# Patient Record
Sex: MALE | Race: WHITE | NOT HISPANIC OR LATINO | URBAN - METROPOLITAN AREA
[De-identification: names, ages, dates, MRNs, and addresses within clinical notes are randomized per-mention and may not be internally consistent; named-entity substitution may affect disease eponyms.]

---

## 2018-03-12 ENCOUNTER — THERAPY VISIT (OUTPATIENT)
Dept: PHYSICAL THERAPY | Facility: CLINIC | Age: 38
End: 2018-03-12
Payer: OTHER MISCELLANEOUS

## 2018-03-12 DIAGNOSIS — M25.611 DECREASED ROM OF RIGHT SHOULDER: ICD-10-CM

## 2018-03-12 DIAGNOSIS — M25.621 DECREASED ROM OF RIGHT ELBOW: ICD-10-CM

## 2018-03-12 DIAGNOSIS — M25.511 RIGHT SHOULDER PAIN: Primary | ICD-10-CM

## 2018-03-12 DIAGNOSIS — Z47.89 AFTERCARE FOLLOWING SURGERY OF THE MUSCULOSKELETAL SYSTEM: ICD-10-CM

## 2018-03-12 PROCEDURE — 97110 THERAPEUTIC EXERCISES: CPT | Mod: GP | Performed by: PHYSICAL THERAPIST

## 2018-03-12 PROCEDURE — 97161 PT EVAL LOW COMPLEX 20 MIN: CPT | Mod: GP | Performed by: PHYSICAL THERAPIST

## 2018-03-12 NOTE — PROGRESS NOTES
"[facility] Initial Evaluation  Subjective:  Patient is a 37 year old male presenting with rehab right shoulder hpi. The history is provided by the patient. No  was used.   Virgil Collazo is a 37 year old male with a right shoulder condition.  Condition occurred with:  A fall.  Condition occurred: at work.  This is a recurrent condition  Patient reports that original injury occurred on 08/30.16 following a fall at work. The injury was initially treated conservatively, but ultimately required a biceps tenodesis on 12/29/17. On 01/01/18 he was sleeping in recliner and awoke to an \"awful pain\" in his R shoulder and reports MRI confirmed he \"ripped the tendon off the anchor\". Surgery completed again on 2/2/18. Patient has follow-up with surgeon on 3/20/18..    Patient reports pain:  Anterior and lateral.  Radiates to:  Shoulder and elbow.  Pain is described as other (Throbbing) and is constant and reported as 5/10.   Pain is the same all the time.  Symptoms are exacerbated by certain positions and relieved by rest.    Special tests:  X-ray and MRI.  Previous treatment includes surgery.    General health as reported by patient is good.  Pertinent medical history includes:  High blood pressure.  Medical allergies: no.  Other surgeries include:  Orthopedic surgery (R elbow).  Current medications:  High blood pressure medication.  Current occupation is Construction/service check.  Patient is currently not working due to present treatment problem (Return to work on light duty 4/2/18).  Primary job tasks include:  Prolonged standing, lifting, repetitive tasks, operating a machine and driving.    Barriers include:  None as reported by the patient.    Red flags:  None as reported by the patient.                        Objective:  Standing Alignment:    Cervical/Thoracic:  Forward head  Shoulder/UE:  Rounded shoulders                                  Cervical/Thoracic " Evaluation  Cervical AROM: normal (Increased muscular tension through all AROM)                                  Shoulder Evaluation:  ROM:  AROM:  : Unable to assess all motions due to precautions.                            PROM:    Flexion:  Right: Scaption to ~70 degrees                                Strength:  not assessed                                 ROM:  AROM:      Flexion Elbow:  Right:WFL  Extension Elbow:  Right: Lacking 40 degrees  Flexion Wrist:  Right: WFL  Extension Wrist:  Right:  WFL  Supination Elbow/Wrist:  Right: Decreased w/ discomfort  Pronation Elbow/Wrist:  Right: Decreased w/ discomfort  Radial Deviation:  Right: WFL  Ulnar Deviation:  Right: WFL        Pain: Significant guarding secondary to pain  Endfeel: Empty  Strength:  not assessed                                                        General     Review of Systems   Skin:        Incisions healed with good scar mobility       Assessment/Plan:    Patient is a 37 year old male with right side shoulder and right side elbow complaints.    Patient has the following significant findings with corresponding treatment plan.                Diagnosis 1:  S/p R biceps tenodesis  Pain -  hot/cold therapy, electric stimulation, manual therapy, splint/taping/bracing/orthotics, self management, education and home program  Decreased ROM/flexibility - manual therapy, therapeutic exercise and home program  Decreased joint mobility - manual therapy, therapeutic exercise and home program  Decreased strength - therapeutic exercise, therapeutic activities and home program  Decreased proprioception - neuro re-education, therapeutic activities and home program  Impaired muscle performance - neuro re-education and home program  Decreased function - therapeutic activities and home program  Impaired posture - neuro re-education and home program    Therapy Evaluation Codes:   1) History comprised of:   Personal factors that impact the plan of care:       Anxiety, Profession and Work status.    Comorbidity factors that impact the plan of care are:      High blood pressure.     Medications impacting care: High blood pressure.  2) Examination of Body Systems comprised of:   Body structures and functions that impact the plan of care:      Elbow and Shoulder.   Activity limitations that impact the plan of care are:      Bathing, Cooking, Driving, Dressing, Grasping, Lifting and Working.  3) Clinical presentation characteristics are:   Stable/Uncomplicated.  4) Decision-Making    Low complexity using standardized patient assessment instrument and/or measureable assessment of functional outcome.  Cumulative Therapy Evaluation is: Low complexity.    Previous and current functional limitations:  (See Goal Flow Sheet for this information)    Short term and Long term goals: (See Goal Flow Sheet for this information)     Communication ability:  Patient appears to be able to clearly communicate and understand verbal and written communication and follow directions correctly.  Treatment Explanation - The following has been discussed with the patient:   RX ordered/plan of care  Anticipated outcomes  Possible risks and side effects  This patient would benefit from PT intervention to resume normal activities.   Rehab potential is good.    Frequency:  2 X week for 4 weeks, then 1X week for additional 4 weeks with additional follow-up two weeks after for a total of 10 weeks , once daily  Duration:  for 10 total weeks  Discharge Plan:  Achieve all LTG.  Independent in home treatment program.  Return to work with or without restrictions.  Reach maximal therapeutic benefit.    Please refer to the daily flowsheet for treatment today, total treatment time and time spent performing 1:1 timed codes.

## 2018-03-12 NOTE — LETTER
"[facility] PHYSICAL [facility]ERAPY  [address] [address]  [phone]    2018    Re: Virgil Collazo   :   1980  MRN:  [id number]   REFERRING PHYSICIAN:   [facility]    Date of Initial Evaluation:  3/12/2018  Visits:  Rxs Used: 1  Reason for Referral:     Right shoulder pain  Decreased ROM of right shoulder  Decreased ROM of right elbow  Aftercare following surgery of the musculoskeletal system    EVALUATION SUMMARY    [place] [facility] Initial Evaluation  Subjective:  Patient is a 37 year old male presenting with rehab right shoulder hpi. The history is provided by the patient. No  was used. Virgil Collazo is a 37 year old male with a right shoulder condition.  Condition occurred with:  A fall.  Condition occurred: at work.  This is a recurrent condition  Patient reports that original injury occurred on  following a fall at work. The injury was initially treated conservatively, but ultimately required a biceps tenodesis on 17. On 18 he was sleeping in recliner and awoke to an \"awful pain\" in his R shoulder and reports MRI confirmed he \"ripped the tendon off the anchor\". Surgery completed again on 18. Patient has follow-up with surgeon on 3/20/18..  Patient reports pain:  Anterior and lateral.  Radiates to:  Shoulder and elbow.  Pain is described as other (Throbbing) and is constant and reported as 5/10.   Pain is the same all the time.  Symptoms are exacerbated by certain positions and relieved by rest.    Special tests:  X-ray and MRI.  Previous treatment includes surgery.    General health as reported by patient is good.  Pertinent medical history includes:  High blood pressure.  Medical allergies: no.  Other surgeries include:  Orthopedic surgery (R elbow).  Current medications:  High blood pressure medication.  Current occupation is Construction/service check.  Patient is " currently not working due to present treatment problem (Return to work on light duty 4/2/18).  Primary job tasks include:  Prolonged standing, lifting, repetitive tasks, operating a machine and driving. Barriers include:  None as reported by the patient.  Red flags:  None as reported by the patient.                 Objective:  Standing Alignment:    Cervical/Thoracic:  Forward head  Shoulder/UE:  Rounded shoulders  Cervical/Thoracic Evaluation  Cervical AROM: normal (Increased muscular tension through all AROM)     Shoulder Evaluation:  ROM:  AROM:  : Unable to assess all motions due to precautions.  PROM:    Flexion:  Right: Scaption to ~70 degrees    Strength:  not assessed  ROM:  AROM:    Flexion Elbow:  Right:WFL  Extension Elbow:  Right: Lacking 40 degrees  Flexion Wrist:  Right: WFL  Extension Wrist:  Right:  WFL  Supination Elbow/Wrist:  Right: Decreased w/ discomfort  Pronation Elbow/Wrist:  Right: Decreased w/ discomfort  Radial Deviation:  Right: WFL  Ulnar Deviation:  Right: WFL      Pain: Significant guarding secondary to pain  Endfeel: Empty  Strength:  not assessed  Review of Systems   Skin:    Incisions healed with good scar mobility     Assessment/Plan:    Patient is a 37 year old male with right side shoulder and right side elbow complaints.    Patient has the following significant findings with corresponding treatment plan.                Diagnosis 1:  S/p R biceps tenodesis  Pain -  hot/cold therapy, electric stimulation, manual therapy, splint/taping/bracing/orthotics, self management, education and home program  Decreased ROM/flexibility - manual therapy, therapeutic exercise and home program  Decreased joint mobility - manual therapy, therapeutic exercise and home program  Decreased strength - therapeutic exercise, therapeutic activities and home program  Decreased proprioception - neuro re-education, therapeutic activities and home program  Impaired muscle performance - neuro re-education and  home program  Decreased function - therapeutic activities and home program  Impaired posture - neuro re-education and home program  Therapy Evaluation Codes:   1) History comprised of:   Personal factors that impact the plan of care:      Anxiety, Profession and Work status.    Comorbidity factors that impact the plan of care are:      High blood pressure.     Medications impacting care: High blood pressure.  2) Examination of Body Systems comprised of:   Body structures and functions that impact the plan of care:      Elbow and Shoulder.   Activity limitations that impact the plan of care are:      Bathing, Cooking, Driving, Dressing, Grasping, Lifting and Working.  3) Clinical presentation characteristics are:   Stable/Uncomplicated.  4) Decision-Making    Low complexity using standardized patient assessment instrument and/or  measureable assessment of functional outcome.  Cumulative Therapy Evaluation is: Low complexity.  Previous and current functional limitations:  (See Goal Flow Sheet for this information)    Short term and Long term goals: (See Goal Flow Sheet for this information)   Communication ability:  Patient appears to be able to clearly communicate and understand verbal and written communication and follow directions correctly.  Treatment Explanation - The following has been discussed with the patient:   RX ordered/plan of care  Anticipated outcomes  Possible risks and side effects  This patient would benefit from PT intervention to resume normal activities.   Rehab potential is good.  Frequency:  2 X week for 4 weeks, then 1X week for additional 4 weeks with additional follow-up two weeks after for a total of 10 weeks , once daily  Duration:  for 10 total weeks  Discharge Plan:  Achieve all LTG.  Independent in home treatment program.  Return to work with or without restrictions.  Reach maximal therapeutic benefit.      Thank you for your referral.    INQUIRIES  Therapist: MARTIN Spicer  [facility]  [address] [address]  Phone: [phone]  Fax: [phone]

## 2018-03-15 ENCOUNTER — THERAPY VISIT (OUTPATIENT)
Dept: PHYSICAL THERAPY | Facility: CLINIC | Age: 38
End: 2018-03-15
Payer: OTHER MISCELLANEOUS

## 2018-03-15 DIAGNOSIS — M25.621 DECREASED ROM OF RIGHT ELBOW: ICD-10-CM

## 2018-03-15 DIAGNOSIS — Z47.89 AFTERCARE FOLLOWING SURGERY OF THE MUSCULOSKELETAL SYSTEM: ICD-10-CM

## 2018-03-15 DIAGNOSIS — M25.511 ACUTE PAIN OF RIGHT SHOULDER: ICD-10-CM

## 2018-03-15 DIAGNOSIS — M25.611 DECREASED ROM OF RIGHT SHOULDER: ICD-10-CM

## 2018-03-15 PROCEDURE — 97110 THERAPEUTIC EXERCISES: CPT | Mod: GP | Performed by: PHYSICAL THERAPIST

## 2018-03-22 ENCOUNTER — THERAPY VISIT (OUTPATIENT)
Dept: PHYSICAL THERAPY | Facility: CLINIC | Age: 38
End: 2018-03-22
Payer: OTHER MISCELLANEOUS

## 2018-03-22 DIAGNOSIS — Z47.89 AFTERCARE FOLLOWING SURGERY OF THE MUSCULOSKELETAL SYSTEM: ICD-10-CM

## 2018-03-22 DIAGNOSIS — M25.511 ACUTE PAIN OF RIGHT SHOULDER: ICD-10-CM

## 2018-03-22 DIAGNOSIS — M25.611 DECREASED ROM OF RIGHT SHOULDER: ICD-10-CM

## 2018-03-22 DIAGNOSIS — M25.621 DECREASED ROM OF RIGHT ELBOW: ICD-10-CM

## 2018-03-22 PROCEDURE — 97110 THERAPEUTIC EXERCISES: CPT | Mod: GP | Performed by: PHYSICAL THERAPIST

## 2018-03-26 ENCOUNTER — THERAPY VISIT (OUTPATIENT)
Dept: PHYSICAL THERAPY | Facility: CLINIC | Age: 38
End: 2018-03-26
Payer: OTHER MISCELLANEOUS

## 2018-03-26 DIAGNOSIS — M25.621 DECREASED ROM OF RIGHT ELBOW: ICD-10-CM

## 2018-03-26 DIAGNOSIS — M25.611 DECREASED ROM OF RIGHT SHOULDER: ICD-10-CM

## 2018-03-26 DIAGNOSIS — Z47.89 AFTERCARE FOLLOWING SURGERY OF THE MUSCULOSKELETAL SYSTEM: ICD-10-CM

## 2018-03-26 DIAGNOSIS — M25.511 ACUTE PAIN OF RIGHT SHOULDER: ICD-10-CM

## 2018-03-26 PROCEDURE — 97110 THERAPEUTIC EXERCISES: CPT | Mod: GP | Performed by: PHYSICAL THERAPIST

## 2018-04-02 ENCOUNTER — THERAPY VISIT (OUTPATIENT)
Dept: PHYSICAL THERAPY | Facility: CLINIC | Age: 38
End: 2018-04-02
Payer: OTHER MISCELLANEOUS

## 2018-04-02 DIAGNOSIS — M25.529 ELBOW PAIN: Primary | ICD-10-CM

## 2018-04-02 DIAGNOSIS — S59.902A INJURY OF LEFT ELBOW, INITIAL ENCOUNTER: ICD-10-CM

## 2018-04-02 PROBLEM — S59.909A ELBOW INJURY: Status: ACTIVE | Noted: 2018-04-02

## 2018-04-02 PROCEDURE — 97110 THERAPEUTIC EXERCISES: CPT | Mod: GP | Performed by: PHYSICAL THERAPIST

## 2018-04-02 PROCEDURE — 97140 MANUAL THERAPY 1/> REGIONS: CPT | Mod: GP | Performed by: PHYSICAL THERAPIST

## 2018-04-02 PROCEDURE — 97161 PT EVAL LOW COMPLEX 20 MIN: CPT | Mod: GP | Performed by: PHYSICAL THERAPIST

## 2018-04-02 NOTE — LETTER
"[facility] PHYSICAL THERAPY  [address] [address]  [phone]    2018    Re: Virgil Collazo   :   1980  MRN:  [id number]   [facility]IC[facility]    Date of Initial Evaluation:  2018  Visits:     Reason for Referral:     Elbow pain  Injury of left elbow, initial encounter    EVALUATION SUMMARY    [place] [facility] Initial Evaluation  Subjective:  Patient is a 37 year old male presenting with rehab left elbow hpi. The history is provided by the patient. No  was used.   Virgil Collazo is a 37 year old male with a left elbow condition.  Condition occurred with:  A fall (Ladder slipped resulting in a fall).  Condition occurred: at work.  This is a chronic condition  Patient presents with L elbow pain that occurred due to an accident at work on 16. Surgery was performed in 2016. He reports that his elbow \"feels about the same as it did prior to surgery\" and reports increased pain with any squeezing motions. He states he has a lifting restriction of 10-15 lbs..    Patient reports pain:  Medial.    Pain is described as aching and other (throbbing) and is constant and reported as 3/10.   Pain is worse during the day.  Symptoms are exacerbated by carrying and other (squeezing) and relieved by rest.  Since onset symptoms are unchanged.  Special tests:  X-ray.  Previous treatment includes surgery.  There was no improvement following previous treatment.  General health as reported by patient is good.  Pertinent medical history includes:  High blood pressure.  Medical allergies: no.  Other surgeries include:  Orthopedic surgery (R shoulder).  Current medications:  High blood pressure medication.  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  Barriers include:  None as reported by the patient. Red flags:  None " as reported by the patient.               Objective:  Standing Alignment:    Cervical/Thoracic:  Forward head  Shoulder/UE:  Rounded shoulders      ROM:  AROM:  normal (All motions WFL, with increased pain with full extension and pronation)    Strength:  Strength wnl elbow/wrist: 85 lbs as measured with hand dynamometer.    General     ROS    Assessment/Plan:    Patient is a 37 year old male with left side elbow complaints.    Patient has the following significant findings with corresponding treatment plan.                Diagnosis 1:  L elbow  Pain -  hot/cold therapy, electric stimulation, manual therapy, splint/taping/bracing/orthotics, self management, education and home program  Decreased ROM/flexibility - manual therapy, therapeutic exercise and home program  Decreased strength - therapeutic exercise, therapeutic activities and home program  Impaired muscle performance - neuro re-education and home program  Decreased function - therapeutic activities and home program    Therapy Evaluation Codes:   1) History comprised of:   Personal factors that impact the plan of care:      Work status.    Comorbidity factors that impact the plan of care are:      High blood pressure.     Medications impacting care: High blood pressure.  2) Examination of Body Systems comprised of:   Body structures and functions that impact the plan of care:      Elbow.   Activity limitations that impact the plan of care are:      Grasping, Lifting and Working.  3) Clinical presentation characteristics are:   Stable/Uncomplicated.  4) Decision-Making    Low complexity using standardized patient assessment instrument and/or measureable assessment of functional outcome.  Cumulative Therapy Evaluation is: Low complexity.    Previous and current functional limitations:  (See Goal Flow Sheet for this information)    Short term and Long term goals: (See Goal Flow Sheet for this information)     Communication ability:  Patient appears to be able to  clearly communicate and understand verbal and written communication and follow directions correctly.  Treatment Explanation - The following has been discussed with the patient:   RX ordered/plan of care  Anticipated outcomes  Possible risks and side effects  This patient would benefit from PT intervention to resume normal activities.   Rehab potential is good.    Frequency:  1 X week, once daily  Duration:  for 8 weeks  Discharge Plan:  Achieve all LTG.  Independent in home treatment program.  Reach maximal therapeutic benefit.    Please refer to the daily flowsheet for treatment today, total treatment time and time spent performing 1:1 timed codes.     Thank you for your referral.    INQUIRIES  Therapist: Shahrzad Mccabe, PT   [facility] PHYSICAL THERAP[facility]  [address] [address]  Phone: [phone]  Fax: [phone]

## 2018-04-02 NOTE — PROGRESS NOTES
"[facility] Initial Evaluation  Subjective:  Patient is a 37 year old male presenting with rehab left elbow hpi. The history is provided by the patient. No  was used.   Virgil Collazo is a 37 year old male with a left elbow condition.  Condition occurred with:  A fall (Ladder slipped resulting in a fall).  Condition occurred: at work.  This is a chronic condition  Patient presents with L elbow pain that occurred due to an accident at work on 08/30/16. Surgery was performed in October of 2016. He reports that his elbow \"feels about the same as it did prior to surgery\" and reports increased pain with any squeezing motions. He states he has a lifting restriction of 10-15 lbs..    Patient reports pain:  Medial.    Pain is described as aching and other (throbbing) and is constant and reported as 3/10.   Pain is worse during the day.  Symptoms are exacerbated by carrying and other (squeezing) and relieved by rest.  Since onset symptoms are unchanged.  Special tests:  X-ray.  Previous treatment includes surgery.  There was no improvement following previous treatment.  General health as reported by patient is good.  Pertinent medical history includes:  High blood pressure.  Medical allergies: no.  Other surgeries include:  Orthopedic surgery (R shoulder).  Current medications:  High blood pressure medication.  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      Barriers include:  None as reported by the patient.    Red flags:  None as reported by the patient.                        Objective:  Standing Alignment:    Cervical/Thoracic:  Forward head  Shoulder/UE:  Rounded shoulders                                            ROM:  AROM:  normal (All motions WFL, with increased pain with full extension and pronation)                          Strength:  Strength wnl elbow/wrist: 85 lbs as measured with hand " dynamometer.                                                        General     ROS    Assessment/Plan:    Patient is a 37 year old male with left side elbow complaints.    Patient has the following significant findings with corresponding treatment plan.                Diagnosis 1:  L elbow  Pain -  hot/cold therapy, electric stimulation, manual therapy, splint/taping/bracing/orthotics, self management, education and home program  Decreased ROM/flexibility - manual therapy, therapeutic exercise and home program  Decreased strength - therapeutic exercise, therapeutic activities and home program  Impaired muscle performance - neuro re-education and home program  Decreased function - therapeutic activities and home program    Therapy Evaluation Codes:   1) History comprised of:   Personal factors that impact the plan of care:      Work status.    Comorbidity factors that impact the plan of care are:      High blood pressure.     Medications impacting care: High blood pressure.  2) Examination of Body Systems comprised of:   Body structures and functions that impact the plan of care:      Elbow.   Activity limitations that impact the plan of care are:      Grasping, Lifting and Working.  3) Clinical presentation characteristics are:   Stable/Uncomplicated.  4) Decision-Making    Low complexity using standardized patient assessment instrument and/or measureable assessment of functional outcome.  Cumulative Therapy Evaluation is: Low complexity.    Previous and current functional limitations:  (See Goal Flow Sheet for this information)    Short term and Long term goals: (See Goal Flow Sheet for this information)     Communication ability:  Patient appears to be able to clearly communicate and understand verbal and written communication and follow directions correctly.  Treatment Explanation - The following has been discussed with the patient:   RX ordered/plan of care  Anticipated outcomes  Possible risks and side  effects  This patient would benefit from PT intervention to resume normal activities.   Rehab potential is good.    Frequency:  1 X week, once daily  Duration:  for 8 weeks  Discharge Plan:  Achieve all LTG.  Independent in home treatment program.  Reach maximal therapeutic benefit.    Please refer to the daily flowsheet for treatment today, total treatment time and time spent performing 1:1 timed codes.

## 2018-04-05 ENCOUNTER — THERAPY VISIT (OUTPATIENT)
Dept: PHYSICAL THERAPY | Facility: CLINIC | Age: 38
End: 2018-04-05
Payer: OTHER MISCELLANEOUS

## 2018-04-05 DIAGNOSIS — M25.511 ACUTE PAIN OF RIGHT SHOULDER: ICD-10-CM

## 2018-04-05 DIAGNOSIS — S59.902A INJURY OF LEFT ELBOW, INITIAL ENCOUNTER: ICD-10-CM

## 2018-04-05 DIAGNOSIS — M25.611 DECREASED ROM OF RIGHT SHOULDER: ICD-10-CM

## 2018-04-05 DIAGNOSIS — Z47.89 AFTERCARE FOLLOWING SURGERY OF THE MUSCULOSKELETAL SYSTEM: ICD-10-CM

## 2018-04-05 DIAGNOSIS — M25.621 DECREASED ROM OF RIGHT ELBOW: ICD-10-CM

## 2018-04-05 DIAGNOSIS — M25.522 LEFT ELBOW PAIN: ICD-10-CM

## 2018-04-05 PROCEDURE — 97110 THERAPEUTIC EXERCISES: CPT | Mod: GP | Performed by: PHYSICAL THERAPIST

## 2018-04-05 PROCEDURE — 97140 MANUAL THERAPY 1/> REGIONS: CPT | Mod: GP | Performed by: PHYSICAL THERAPIST

## 2018-04-05 PROCEDURE — 97035 APP MDLTY 1+ULTRASOUND EA 15: CPT | Mod: GP | Performed by: PHYSICAL THERAPIST

## 2018-04-09 ENCOUNTER — THERAPY VISIT (OUTPATIENT)
Dept: PHYSICAL THERAPY | Facility: CLINIC | Age: 38
End: 2018-04-09
Payer: OTHER MISCELLANEOUS

## 2018-04-09 DIAGNOSIS — M25.511 ACUTE PAIN OF RIGHT SHOULDER: ICD-10-CM

## 2018-04-09 DIAGNOSIS — S59.902A INJURY OF LEFT ELBOW, INITIAL ENCOUNTER: ICD-10-CM

## 2018-04-09 DIAGNOSIS — M25.611 DECREASED ROM OF RIGHT SHOULDER: ICD-10-CM

## 2018-04-09 DIAGNOSIS — M25.621 DECREASED ROM OF RIGHT ELBOW: ICD-10-CM

## 2018-04-09 DIAGNOSIS — M25.522 LEFT ELBOW PAIN: ICD-10-CM

## 2018-04-09 DIAGNOSIS — Z47.89 AFTERCARE FOLLOWING SURGERY OF THE MUSCULOSKELETAL SYSTEM: ICD-10-CM

## 2018-04-09 PROCEDURE — 97110 THERAPEUTIC EXERCISES: CPT | Mod: GP | Performed by: PHYSICAL THERAPIST

## 2018-04-12 ENCOUNTER — THERAPY VISIT (OUTPATIENT)
Dept: PHYSICAL THERAPY | Facility: CLINIC | Age: 38
End: 2018-04-12
Payer: OTHER MISCELLANEOUS

## 2018-04-12 DIAGNOSIS — M25.611 DECREASED ROM OF RIGHT SHOULDER: ICD-10-CM

## 2018-04-12 DIAGNOSIS — M25.511 ACUTE PAIN OF RIGHT SHOULDER: ICD-10-CM

## 2018-04-12 DIAGNOSIS — M25.621 DECREASED ROM OF RIGHT ELBOW: ICD-10-CM

## 2018-04-12 DIAGNOSIS — Z47.89 AFTERCARE FOLLOWING SURGERY OF THE MUSCULOSKELETAL SYSTEM: ICD-10-CM

## 2018-04-12 PROCEDURE — 97110 THERAPEUTIC EXERCISES: CPT | Mod: GP | Performed by: PHYSICAL THERAPIST

## 2018-04-16 ENCOUNTER — THERAPY VISIT (OUTPATIENT)
Dept: PHYSICAL THERAPY | Facility: CLINIC | Age: 38
End: 2018-04-16
Payer: OTHER MISCELLANEOUS

## 2018-04-16 DIAGNOSIS — Z47.89 AFTERCARE FOLLOWING SURGERY OF THE MUSCULOSKELETAL SYSTEM: ICD-10-CM

## 2018-04-16 DIAGNOSIS — M25.621 DECREASED ROM OF RIGHT ELBOW: ICD-10-CM

## 2018-04-16 DIAGNOSIS — M25.511 ACUTE PAIN OF RIGHT SHOULDER: ICD-10-CM

## 2018-04-16 DIAGNOSIS — M25.611 DECREASED ROM OF RIGHT SHOULDER: ICD-10-CM

## 2018-04-16 PROCEDURE — 97110 THERAPEUTIC EXERCISES: CPT | Mod: GP | Performed by: PHYSICAL THERAPIST

## 2018-04-19 ENCOUNTER — TELEPHONE (OUTPATIENT)
Dept: PHYSICAL THERAPY | Facility: CLINIC | Age: 38
End: 2018-04-19

## 2018-04-19 ENCOUNTER — THERAPY VISIT (OUTPATIENT)
Dept: PHYSICAL THERAPY | Facility: CLINIC | Age: 38
End: 2018-04-19
Payer: OTHER MISCELLANEOUS

## 2018-04-19 DIAGNOSIS — Z47.89 AFTERCARE FOLLOWING SURGERY OF THE MUSCULOSKELETAL SYSTEM: ICD-10-CM

## 2018-04-19 DIAGNOSIS — M25.611 DECREASED ROM OF RIGHT SHOULDER: ICD-10-CM

## 2018-04-19 DIAGNOSIS — M25.621 DECREASED ROM OF RIGHT ELBOW: ICD-10-CM

## 2018-04-19 DIAGNOSIS — M25.511 ACUTE PAIN OF RIGHT SHOULDER: ICD-10-CM

## 2018-04-19 PROCEDURE — 97110 THERAPEUTIC EXERCISES: CPT | Mod: GP | Performed by: PHYSICAL THERAPIST

## 2018-04-27 ENCOUNTER — THERAPY VISIT (OUTPATIENT)
Dept: PHYSICAL THERAPY | Facility: CLINIC | Age: 38
End: 2018-04-27
Payer: OTHER MISCELLANEOUS

## 2018-04-27 DIAGNOSIS — M25.511 ACUTE PAIN OF RIGHT SHOULDER: ICD-10-CM

## 2018-04-27 DIAGNOSIS — Z47.89 AFTERCARE FOLLOWING SURGERY OF THE MUSCULOSKELETAL SYSTEM: ICD-10-CM

## 2018-04-27 DIAGNOSIS — M25.611 DECREASED ROM OF RIGHT SHOULDER: ICD-10-CM

## 2018-04-27 DIAGNOSIS — M25.621 DECREASED ROM OF RIGHT ELBOW: ICD-10-CM

## 2018-04-27 PROCEDURE — 97140 MANUAL THERAPY 1/> REGIONS: CPT | Mod: GP | Performed by: PHYSICAL THERAPIST

## 2018-04-27 PROCEDURE — 97110 THERAPEUTIC EXERCISES: CPT | Mod: GP | Performed by: PHYSICAL THERAPIST

## 2018-04-27 NOTE — LETTER
[facility]  [address]  [address]  [place] [address]  [phone]    2018    Re: Virgil Collazo   :   1980  MRN:  [id number]   REFERRING PHYSICIAN:   Hilton Salomon    [facility]    Date of Initial Evaluation: 3/12/18  Visits:  Rxs Used: 10  Reason for Referral:     Acute pain of right shoulder  Decreased ROM of right shoulder  Decreased ROM of right elbow  Aftercare following surgery of the musculoskeletal system    PROGRESS  REPORT    Progress reporting period is from 3/12/18 to 18.       SUBJECTIVE  Subjective changes noted by patient:   Patient reports his shoulder is sore today; he reports that he thinks work is aggravating it. Patient reports that the left elbow gets irritated with squeezing and working with that arm    Current Pain level: 6/10.     Initial Pain level: 3/10.   Changes in function:  Yes (See Goal flowsheet attached for changes in current functional level)  Adverse reaction to treatment or activity: None    OBJECTIVE  Changes noted in objective findings:  Right shoulder PROM at start of treatment session: flexion 150, abduction 144, ER 60. After stretching, PROM flexion 162, abduction 158, ER 85. Patient remains limited in AROM & AAROM by pain.     ASSESSMENT/PLAN  Updated problem list and treatment plan: Diagnosis 1:  S/p R biceps tenodesis    Pain -  self management, education, directional preference exercise and home program  Decreased ROM/flexibility - manual therapy, therapeutic exercise and home program  Decreased strength - therapeutic exercise, therapeutic activities and home program  Impaired muscle performance - neuro re-education and home program  Decreased function - therapeutic activities and home program  STG/LTGs have been met or progress has been made towards goals:  Yes (See Goal flow sheet completed today.)  Assessment of Progress: The patient's condition is  improving.  Patient is meeting short term goals and is progressing towards long term goals.  Self Management Plans:  Patient has been instructed in a home treatment program.  Patient  has been instructed in self management of symptoms.   Virgil Collazo   :   1980        I have re-evaluated this patient and find that the nature, scope, duration and intensity of the therapy is appropriate for the medical condition of the patient.  Virgli continues to require the following intervention to meet STG and LTG's:  PT    Recommendations:  This patient would benefit from continued therapy.     Frequency:  1 X week, once daily  Duration:  for 8 weeks                    Thank you for your referral.    INQUIRIES  Therapist: Jp Corbin DPT  [facility]  [address]  [place] [address]  Phone: [phone]  Fax: [phone]

## 2018-04-27 NOTE — MR AVS SNAPSHOT
"              After Visit Summary   4/27/2018    Virgil Collazo    MRN: [id number]           Patient Information     Date Of Birth          1980        Visit Information        Provider Department      4/27/2018 7:00 AM Jp Corbin PT [facility]        Today's Diagnoses     Acute pain of right shoulder        Decreased ROM of right shoulder        Decreased ROM of right elbow        Aftercare following surgery of the musculoskeletal system           Follow-ups after your visit        Your next 10 appointments already scheduled     May 03, 2018  4:25 PM CDT   [facility] Extremity with Jp Corbin PT   [facility] ([facility] [place]  )    [address] [address] [phone]              Who to contact     If you have questions or need follow up information about today's clinic visit or your schedule please contact [facility] directly at [phone].  Normal or non-critical lab and imaging results will be communicated to you by MyChart, letter or phone within 4 business days after the clinic has received the results. If you do not hear from us within 7 days, please contact the clinic through [employer] or phone. If you have a critical or abnormal lab result, we will notify you by phone as soon as possible.  Submit refill requests through [employer] or call your pharmacy and they will forward the refill request to us. Please allow 3 business days for your refill to be completed.          Additional Information About Your Visit        MyChart Information     [employer]work lets you send messages to your doctor, view your test results, renew your prescriptions, schedule appointments and more. To sign up, go to [email or web address] . Click on \"Log in\" on the left side of the screen, which will take you to the Welcome page. Then click on \"Sign up Now\" on the right side of the page.     You will be asked to enter the access code " listed below, as well as some personal information. Please follow the directions to create your username and password.     Your access code is: [email or web address]  Expires: 2018 12:08 PM     Your access code will  in 90 days. If you need help or a new code, please call your [facility] or [phone].        Care EveryWhere ID     This is your Care EveryWhere ID. This could be used by other organizations to access your [facility] medical records  [id number]         Blood Pressure from Last 3 Encounters:   13 (!) 140/101    Weight from Last 3 Encounters:   No data found for Wt              We Performed the Following     [facility] PROGRESS NOTES REPORT     MANUAL THER TECH,1+REGIONS,EA 15 MIN     THERAPEUTIC EXERCISES        Primary Care Provider Office Phone # Fax #    Scott Wolf [phone]       [facility] [address] [facility]  [place] [address]        Equal Access to Services     [facility] : Hadii aad ku hadasho Soomaali, waaxda luqadaha, qaybta kaalmada adeegyada, waxay idiin hayaan adeeg prince carlos . So [facility] [phone].    ATENCIÓN: Si habla español, tiene a rose disposición servicios gratuitos de asistencia lingüística. Llame al [phone].    We comply with applicable federal civil rights laws and [place] laws. We do not discriminate on the basis of race, color, national origin, age, disability, sex, sexual orientation, or gender identity.            Thank you!     Thank you for choosing [facility]  for your care. Our goal is always to provide you with excellent care. Hearing back from our patients is one way we can continue to improve our services. Please take a few minutes to complete the written survey that you may receive in the mail after your visit with us. Thank you!             Your Updated Medication List - Protect others around you: Learn how to safely use, store and throw away your medicines at  www.disposemymeds.org.      Notice  As of 4/27/2018 12:08 PM    You have not been prescribed any medications.

## 2018-04-27 NOTE — PROGRESS NOTES
Subjective:  HPI                    Objective:  System    Physical Exam    General     ROS    Assessment/Plan:    PROGRESS  REPORT    Progress reporting period is from 3/12/18 to 4/27/18.       SUBJECTIVE  Subjective changes noted by patient:   Patient reports his shoulder is sore today; he reports that he thinks work is aggravating it. Patient reports that the left elbow gets irritated with squeezing and working with that arm    Current Pain level: 6/10.     Initial Pain level: 3/10.   Changes in function:  Yes (See Goal flowsheet attached for changes in current functional level)  Adverse reaction to treatment or activity: None    OBJECTIVE  Changes noted in objective findings:  Right shoulder PROM at start of treatment session: flexion 150, abduction 144, ER 60. After stretching, PROM flexion 162, abduction 158, ER 85. Patient remains limited in AROM & AAROM by pain.     ASSESSMENT/PLAN  Updated problem list and treatment plan: Diagnosis 1:  S/p R biceps tenodesis    Pain -  self management, education, directional preference exercise and home program  Decreased ROM/flexibility - manual therapy, therapeutic exercise and home program  Decreased strength - therapeutic exercise, therapeutic activities and home program  Impaired muscle performance - neuro re-education and home program  Decreased function - therapeutic activities and home program  STG/LTGs have been met or progress has been made towards goals:  Yes (See Goal flow sheet completed today.)  Assessment of Progress: The patient's condition is improving.  Patient is meeting short term goals and is progressing towards long term goals.  Self Management Plans:  Patient has been instructed in a home treatment program.  Patient  has been instructed in self management of symptoms.  I have re-evaluated this patient and find that the nature, scope, duration and intensity of the therapy is appropriate for the medical condition of the patient.  Virgil continues to  require the following intervention to meet STG and LTG's:  PT    Recommendations:  This patient would benefit from continued therapy.     Frequency:  1 X week, once daily  Duration:  for 8 weeks        Please refer to the daily flowsheet for treatment today, total treatment time and time spent performing 1:1 timed codes.

## 2018-05-03 ENCOUNTER — THERAPY VISIT (OUTPATIENT)
Dept: PHYSICAL THERAPY | Facility: CLINIC | Age: 38
End: 2018-05-03
Payer: OTHER MISCELLANEOUS

## 2018-05-03 DIAGNOSIS — M25.621 DECREASED ROM OF RIGHT ELBOW: ICD-10-CM

## 2018-05-03 DIAGNOSIS — Z47.89 AFTERCARE FOLLOWING SURGERY OF THE MUSCULOSKELETAL SYSTEM: ICD-10-CM

## 2018-05-03 DIAGNOSIS — M25.511 ACUTE PAIN OF RIGHT SHOULDER: ICD-10-CM

## 2018-05-03 DIAGNOSIS — M25.611 DECREASED ROM OF RIGHT SHOULDER: ICD-10-CM

## 2018-05-03 PROCEDURE — 97140 MANUAL THERAPY 1/> REGIONS: CPT | Mod: GP | Performed by: PHYSICAL THERAPIST

## 2018-05-03 PROCEDURE — 97110 THERAPEUTIC EXERCISES: CPT | Mod: GP | Performed by: PHYSICAL THERAPIST

## 2018-05-10 ENCOUNTER — THERAPY VISIT (OUTPATIENT)
Dept: PHYSICAL THERAPY | Facility: CLINIC | Age: 38
End: 2018-05-10
Payer: OTHER MISCELLANEOUS

## 2018-05-10 DIAGNOSIS — M25.511 ACUTE PAIN OF RIGHT SHOULDER: ICD-10-CM

## 2018-05-10 DIAGNOSIS — Z47.89 AFTERCARE FOLLOWING SURGERY OF THE MUSCULOSKELETAL SYSTEM: ICD-10-CM

## 2018-05-10 DIAGNOSIS — M25.611 DECREASED ROM OF RIGHT SHOULDER: ICD-10-CM

## 2018-05-10 DIAGNOSIS — M25.621 DECREASED ROM OF RIGHT ELBOW: ICD-10-CM

## 2018-05-10 PROCEDURE — 97110 THERAPEUTIC EXERCISES: CPT | Mod: GP | Performed by: PHYSICAL THERAPIST

## 2018-05-10 PROCEDURE — 97140 MANUAL THERAPY 1/> REGIONS: CPT | Mod: GP | Performed by: PHYSICAL THERAPIST

## 2018-05-17 ENCOUNTER — THERAPY VISIT (OUTPATIENT)
Dept: PHYSICAL THERAPY | Facility: CLINIC | Age: 38
End: 2018-05-17
Payer: OTHER MISCELLANEOUS

## 2018-05-17 DIAGNOSIS — M25.611 DECREASED ROM OF RIGHT SHOULDER: ICD-10-CM

## 2018-05-17 DIAGNOSIS — M25.621 DECREASED ROM OF RIGHT ELBOW: ICD-10-CM

## 2018-05-17 DIAGNOSIS — Z47.89 AFTERCARE FOLLOWING SURGERY OF THE MUSCULOSKELETAL SYSTEM: ICD-10-CM

## 2018-05-17 DIAGNOSIS — M25.511 ACUTE PAIN OF RIGHT SHOULDER: ICD-10-CM

## 2018-05-17 PROCEDURE — 97112 NEUROMUSCULAR REEDUCATION: CPT | Mod: GP | Performed by: PHYSICAL THERAPIST

## 2018-05-17 PROCEDURE — 97110 THERAPEUTIC EXERCISES: CPT | Mod: GP | Performed by: PHYSICAL THERAPIST

## 2018-05-31 ENCOUNTER — THERAPY VISIT (OUTPATIENT)
Dept: PHYSICAL THERAPY | Facility: CLINIC | Age: 38
End: 2018-05-31
Payer: OTHER MISCELLANEOUS

## 2018-05-31 DIAGNOSIS — M25.611 DECREASED ROM OF RIGHT SHOULDER: ICD-10-CM

## 2018-05-31 DIAGNOSIS — M25.621 DECREASED ROM OF RIGHT ELBOW: ICD-10-CM

## 2018-05-31 DIAGNOSIS — Z47.89 AFTERCARE FOLLOWING SURGERY OF THE MUSCULOSKELETAL SYSTEM: ICD-10-CM

## 2018-05-31 DIAGNOSIS — M25.511 ACUTE PAIN OF RIGHT SHOULDER: ICD-10-CM

## 2018-05-31 PROCEDURE — 97110 THERAPEUTIC EXERCISES: CPT | Mod: GP | Performed by: PHYSICAL THERAPIST

## 2018-05-31 PROCEDURE — 97112 NEUROMUSCULAR REEDUCATION: CPT | Mod: GP | Performed by: PHYSICAL THERAPIST

## 2018-06-18 ENCOUNTER — THERAPY VISIT (OUTPATIENT)
Dept: PHYSICAL THERAPY | Facility: CLINIC | Age: 38
End: 2018-06-18
Payer: OTHER MISCELLANEOUS

## 2018-06-18 DIAGNOSIS — M25.621 DECREASED ROM OF RIGHT ELBOW: ICD-10-CM

## 2018-06-18 DIAGNOSIS — Z47.89 AFTERCARE FOLLOWING SURGERY OF THE MUSCULOSKELETAL SYSTEM: ICD-10-CM

## 2018-06-18 DIAGNOSIS — M25.611 DECREASED ROM OF RIGHT SHOULDER: ICD-10-CM

## 2018-06-18 DIAGNOSIS — M25.511 ACUTE PAIN OF RIGHT SHOULDER: ICD-10-CM

## 2018-06-18 PROCEDURE — 97112 NEUROMUSCULAR REEDUCATION: CPT | Mod: GP | Performed by: PHYSICAL THERAPIST

## 2018-06-18 PROCEDURE — 97530 THERAPEUTIC ACTIVITIES: CPT | Mod: GP | Performed by: PHYSICAL THERAPIST

## 2018-06-18 PROCEDURE — 97110 THERAPEUTIC EXERCISES: CPT | Mod: GP | Performed by: PHYSICAL THERAPIST

## 2018-06-18 NOTE — LETTER
[facility]  [address] [address]  [phone]    2018    Re: Virgil Collazo   :   1980  MRN:  [id number]   [facility]IC[facility]    Date of Initial Evaluation:  2018  Visits:  Rxs Used: 15 (16 total combined with elbow)  Reason for Referral:     Acute pain of right shoulder  Decreased ROM of right shoulder  Decreased ROM of right elbow  Aftercare following surgery of the musculoskeletal system    EVALUATION SUMMARY    Subjective:  HPI                  Objective:  System    Physical Exam    General     ROS    Assessment/Plan:    PROGRESS  REPORT    Progress reporting period is from 18 to 18.       SUBJECTIVE  Subjective changes noted by patient:  Patient reports that his shoulder has been feeling pretty good (better than his left elbow). He reports that he will notice some soreness in the shoulder after working/lifting but doing well overall    Current Pain level: 2/10.     Initial Pain level: 3/10.   Changes in function:  Yes (See Goal flowsheet attached for changes in current functional level)  Adverse reaction to treatment or activity: None    OBJECTIVE  Changes noted in objective findings:   Right shoulder AROM flexion 160, abduction 150. PROM flexion 170, abduction 175, ER 93. Patient demonstrates improved scapular stabilization with exercises and is able to lift to counter height and to low overhead cabinet with minimal difficulty     ASSESSMENT/PLAN  Updated problem list and treatment plan: Diagnosis 1:  S/p R biceps tenodesis      Pain -  self management, education and home program  Decreased strength - therapeutic exercise, therapeutic activities and home program  Impaired muscle performance - neuro re-education and home program  STG/LTGs have been met or progress has been made towards goals:  Yes (See Goal flow sheet completed today.)  Assessment of Progress: The patient has met  all of their long term goals.  Self Management Plans:  Patient has been instructed in a home treatment program.  Patient is independent in a home treatment program.  Patient  has been instructed in self management of symptoms.  Patient is independent in self management of symptoms.  I have re-evaluated this patient and find that the nature, scope, duration and intensity of the therapy is appropriate for the medical condition of the patient.  Virgil continues to require the following intervention to meet STG and LTG's:  Continuation with HEP for the near future.    Recommendations:  Patient is encouraged to call PT if any questions/concerns arise regarding HEP or if he experiences any exacerbation of symptoms in the near future. Will follow-up in 3-4 weeks if further therapy is indicated by change in symptoms. Discharge from PT at that time.     Please refer to the daily flowsheet for treatment today, total treatment time and time spent performing 1:1 timed codes.    Thank you for your referral.    INQUIRIES  Therapist: MARTIN Spicer [facility] PHYSICAL THERAP[facility]  [address] [address]  Phone: [phone]  Fax: [phone]

## 2018-06-19 NOTE — PROGRESS NOTES
Subjective:  HPI                    Objective:  System    Physical Exam    General     ROS    Assessment/Plan:    PROGRESS  REPORT    Progress reporting period is from 4/27/18 to 6/18/18.       SUBJECTIVE  Subjective changes noted by patient:  Patient reports that his shoulder has been feeling pretty good (better than his left elbow). He reports that he will notice some soreness in the shoulder after working/lifting but doing well overall    Current Pain level: 2/10.     Initial Pain level: 3/10.   Changes in function:  Yes (See Goal flowsheet attached for changes in current functional level)  Adverse reaction to treatment or activity: None    OBJECTIVE  Changes noted in objective findings:   Right shoulder AROM flexion 160, abduction 150. PROM flexion 170, abduction 175, ER 93. Patient demonstrates improved scapular stabilization with exercises and is able to lift to counter height and to low overhead cabinet with minimal difficulty     ASSESSMENT/PLAN  Updated problem list and treatment plan: Diagnosis 1:  S/p R biceps tenodesis      Pain -  self management, education and home program  Decreased strength - therapeutic exercise, therapeutic activities and home program  Impaired muscle performance - neuro re-education and home program  STG/LTGs have been met or progress has been made towards goals:  Yes (See Goal flow sheet completed today.)  Assessment of Progress: The patient has met all of their long term goals.  Self Management Plans:  Patient has been instructed in a home treatment program.  Patient is independent in a home treatment program.  Patient  has been instructed in self management of symptoms.  Patient is independent in self management of symptoms.  I have re-evaluated this patient and find that the nature, scope, duration and intensity of the therapy is appropriate for the medical condition of the patient.  Virgil continues to require the following intervention to meet STG and LTG's:  Continuation with  HEP for the near future.    Recommendations:  Patient is encouraged to call PT if any questions/concerns arise regarding HEP or if he experiences any exacerbation of symptoms in the near future. Will follow-up in 3-4 weeks if further therapy is indicated by change in symptoms. Discharge from PT at that time.       Please refer to the daily flowsheet for treatment today, total treatment time and time spent performing 1:1 timed codes.

## 2018-10-19 ENCOUNTER — RECORDS - HEALTHEAST (OUTPATIENT)
Dept: LAB | Facility: CLINIC | Age: 38
End: 2018-10-19

## 2018-10-19 LAB
ALBUMIN SERPL-MCNC: 3.6 G/DL (ref 3.5–5)
ALP SERPL-CCNC: 75 U/L (ref 45–120)
ALT SERPL W P-5'-P-CCNC: 28 U/L (ref 0–45)
ANION GAP SERPL CALCULATED.3IONS-SCNC: 14 MMOL/L (ref 5–18)
AST SERPL W P-5'-P-CCNC: 23 U/L (ref 0–40)
BILIRUB SERPL-MCNC: 0.5 MG/DL (ref 0–1)
BUN SERPL-MCNC: 20 MG/DL (ref 8–22)
CALCIUM SERPL-MCNC: 9.5 MG/DL (ref 8.5–10.5)
CHLORIDE BLD-SCNC: 108 MMOL/L (ref 98–107)
CO2 SERPL-SCNC: 23 MMOL/L (ref 22–31)
CREAT SERPL-MCNC: 1.07 MG/DL (ref 0.7–1.3)
GFR SERPL CREATININE-BSD FRML MDRD: >60 ML/MIN/1.73M2
GLUCOSE BLD-MCNC: 74 MG/DL (ref 70–125)
POTASSIUM BLD-SCNC: 3.7 MMOL/L (ref 3.5–5)
PROT SERPL-MCNC: 6.9 G/DL (ref 6–8)
SODIUM SERPL-SCNC: 145 MMOL/L (ref 136–145)

## 2019-02-19 ENCOUNTER — RECORDS - HEALTHEAST (OUTPATIENT)
Dept: LAB | Facility: CLINIC | Age: 39
End: 2019-02-19

## 2019-02-19 LAB
ALBUMIN SERPL-MCNC: 3.9 G/DL (ref 3.5–5)
ANION GAP SERPL CALCULATED.3IONS-SCNC: 9 MMOL/L (ref 5–18)
BUN SERPL-MCNC: 14 MG/DL (ref 8–22)
CALCIUM SERPL-MCNC: 9.9 MG/DL (ref 8.5–10.5)
CHLORIDE BLD-SCNC: 106 MMOL/L (ref 98–107)
CO2 SERPL-SCNC: 24 MMOL/L (ref 22–31)
CREAT SERPL-MCNC: 0.84 MG/DL (ref 0.7–1.3)
GFR SERPL CREATININE-BSD FRML MDRD: >60 ML/MIN/1.73M2
GLUCOSE BLD-MCNC: 112 MG/DL (ref 70–125)
PHOSPHATE SERPL-MCNC: 2.9 MG/DL (ref 2.5–4.5)
POTASSIUM BLD-SCNC: 3.9 MMOL/L (ref 3.5–5)
SODIUM SERPL-SCNC: 139 MMOL/L (ref 136–145)

## 2019-05-30 PROBLEM — M25.529 ELBOW PAIN: Status: RESOLVED | Noted: 2018-04-02 | Resolved: 2019-05-30

## 2019-05-30 PROBLEM — M25.621 DECREASED ROM OF RIGHT ELBOW: Status: RESOLVED | Noted: 2018-03-12 | Resolved: 2019-05-30

## 2019-05-30 PROBLEM — S59.909A ELBOW INJURY: Status: RESOLVED | Noted: 2018-04-02 | Resolved: 2019-05-30

## 2019-05-30 PROBLEM — M25.511 RIGHT SHOULDER PAIN: Status: RESOLVED | Noted: 2018-03-12 | Resolved: 2019-05-30

## 2019-05-30 PROBLEM — M25.611 DECREASED ROM OF RIGHT SHOULDER: Status: RESOLVED | Noted: 2018-03-12 | Resolved: 2019-05-30

## 2019-05-30 PROBLEM — Z47.89 AFTERCARE FOLLOWING SURGERY OF THE MUSCULOSKELETAL SYSTEM: Status: RESOLVED | Noted: 2018-03-12 | Resolved: 2019-05-30

## 2019-06-28 ENCOUNTER — RECORDS - HEALTHEAST (OUTPATIENT)
Dept: LAB | Facility: CLINIC | Age: 39
End: 2019-06-28

## 2019-06-28 LAB
ALBUMIN SERPL-MCNC: 3.7 G/DL (ref 3.5–5)
ANION GAP SERPL CALCULATED.3IONS-SCNC: 9 MMOL/L (ref 5–18)
BUN SERPL-MCNC: 15 MG/DL (ref 8–22)
CALCIUM SERPL-MCNC: 9.8 MG/DL (ref 8.5–10.5)
CHLORIDE BLD-SCNC: 111 MMOL/L (ref 98–107)
CO2 SERPL-SCNC: 23 MMOL/L (ref 22–31)
CREAT SERPL-MCNC: 1.1 MG/DL (ref 0.7–1.3)
GFR SERPL CREATININE-BSD FRML MDRD: >60 ML/MIN/1.73M2
GLUCOSE BLD-MCNC: 85 MG/DL (ref 70–125)
PHOSPHATE SERPL-MCNC: 2.8 MG/DL (ref 2.5–4.5)
POTASSIUM BLD-SCNC: 4.1 MMOL/L (ref 3.5–5)
SODIUM SERPL-SCNC: 143 MMOL/L (ref 136–145)

## 2020-01-30 NOTE — PROGRESS NOTES
Please refer to the progress note completed on 4/2/18 for discharge information.     left rail up/right rail down/B hands on rail